# Patient Record
Sex: MALE | Race: WHITE
[De-identification: names, ages, dates, MRNs, and addresses within clinical notes are randomized per-mention and may not be internally consistent; named-entity substitution may affect disease eponyms.]

---

## 2019-09-02 ENCOUNTER — HOSPITAL ENCOUNTER (EMERGENCY)
Dept: HOSPITAL 47 - EC | Age: 54
LOS: 1 days | Discharge: HOME | End: 2019-09-03
Payer: COMMERCIAL

## 2019-09-02 VITALS — TEMPERATURE: 98.1 F

## 2019-09-02 DIAGNOSIS — I16.0: Primary | ICD-10-CM

## 2019-09-02 DIAGNOSIS — K21.9: ICD-10-CM

## 2019-09-02 DIAGNOSIS — Z88.0: ICD-10-CM

## 2019-09-02 DIAGNOSIS — Z79.82: ICD-10-CM

## 2019-09-02 DIAGNOSIS — Z88.5: ICD-10-CM

## 2019-09-02 DIAGNOSIS — Z53.20: ICD-10-CM

## 2019-09-02 DIAGNOSIS — I10: ICD-10-CM

## 2019-09-02 DIAGNOSIS — Z79.899: ICD-10-CM

## 2019-09-02 LAB
ALBUMIN SERPL-MCNC: 4.5 G/DL (ref 3.5–5)
ALP SERPL-CCNC: 107 U/L (ref 38–126)
ALT SERPL-CCNC: 100 U/L (ref 21–72)
ANION GAP SERPL CALC-SCNC: 10 MMOL/L
AST SERPL-CCNC: 55 U/L (ref 17–59)
BASOPHILS # BLD AUTO: 0.1 K/UL (ref 0–0.2)
BASOPHILS NFR BLD AUTO: 1 %
BUN SERPL-SCNC: 16 MG/DL (ref 9–20)
CALCIUM SPEC-MCNC: 9.4 MG/DL (ref 8.4–10.2)
CHLORIDE SERPL-SCNC: 108 MMOL/L (ref 98–107)
CO2 SERPL-SCNC: 24 MMOL/L (ref 22–30)
EOSINOPHIL # BLD AUTO: 0.1 K/UL (ref 0–0.7)
EOSINOPHIL NFR BLD AUTO: 1 %
ERYTHROCYTE [DISTWIDTH] IN BLOOD BY AUTOMATED COUNT: 4.77 M/UL (ref 4.3–5.9)
ERYTHROCYTE [DISTWIDTH] IN BLOOD: 14.9 % (ref 11.5–15.5)
GLUCOSE SERPL-MCNC: 144 MG/DL (ref 74–99)
HCT VFR BLD AUTO: 44 % (ref 39–53)
HGB BLD-MCNC: 15.8 GM/DL (ref 13–17.5)
LYMPHOCYTES # SPEC AUTO: 1.7 K/UL (ref 1–4.8)
LYMPHOCYTES NFR SPEC AUTO: 28 %
MAGNESIUM SPEC-SCNC: 2.2 MG/DL (ref 1.6–2.3)
MCH RBC QN AUTO: 33.1 PG (ref 25–35)
MCHC RBC AUTO-ENTMCNC: 35.8 G/DL (ref 31–37)
MCV RBC AUTO: 92.4 FL (ref 80–100)
MONOCYTES # BLD AUTO: 0.4 K/UL (ref 0–1)
MONOCYTES NFR BLD AUTO: 6 %
NEUTROPHILS # BLD AUTO: 3.7 K/UL (ref 1.3–7.7)
NEUTROPHILS NFR BLD AUTO: 61 %
PH UR: 6.5 [PH] (ref 5–8)
PLATELET # BLD AUTO: 192 K/UL (ref 150–450)
POTASSIUM SERPL-SCNC: 4.1 MMOL/L (ref 3.5–5.1)
PROT SERPL-MCNC: 7.6 G/DL (ref 6.3–8.2)
SODIUM SERPL-SCNC: 142 MMOL/L (ref 137–145)
SP GR UR: 1.01 (ref 1–1.03)
UROBILINOGEN UR QL STRIP: 2 MG/DL (ref ?–2)
WBC # BLD AUTO: 6.1 K/UL (ref 3.8–10.6)

## 2019-09-02 PROCEDURE — 81003 URINALYSIS AUTO W/O SCOPE: CPT

## 2019-09-02 PROCEDURE — 99284 EMERGENCY DEPT VISIT MOD MDM: CPT

## 2019-09-02 PROCEDURE — 70450 CT HEAD/BRAIN W/O DYE: CPT

## 2019-09-02 PROCEDURE — 93005 ELECTROCARDIOGRAM TRACING: CPT

## 2019-09-02 PROCEDURE — 96375 TX/PRO/DX INJ NEW DRUG ADDON: CPT

## 2019-09-02 PROCEDURE — 36415 COLL VENOUS BLD VENIPUNCTURE: CPT

## 2019-09-02 PROCEDURE — 85025 COMPLETE CBC W/AUTO DIFF WBC: CPT

## 2019-09-02 PROCEDURE — 83735 ASSAY OF MAGNESIUM: CPT

## 2019-09-02 PROCEDURE — 96374 THER/PROPH/DIAG INJ IV PUSH: CPT

## 2019-09-02 PROCEDURE — 80053 COMPREHEN METABOLIC PANEL: CPT

## 2019-09-02 PROCEDURE — 96361 HYDRATE IV INFUSION ADD-ON: CPT

## 2019-09-02 NOTE — CT
EXAMINATION TYPE: CT brain wo con

 

DATE OF EXAM: 9/2/2019

 

COMPARISON: 4/24/2013

 

HISTORY: Hypertension and headache.

 

CT DLP: 1137.4 mGycm

Automated exposure control for dose reduction was used.

 

FINDINGS: 

Ventricles and sulci appear normal. There is no mass effect nor midline shift. There is no sign of in
tracranial hemorrhage. The calvarium is intact. There is no sign of cerebral edema.

 

IMPRESSION: 

NEGATIVE CT SCAN OF THE BRAIN. NO CHANGE.

## 2019-09-03 VITALS — DIASTOLIC BLOOD PRESSURE: 99 MMHG | SYSTOLIC BLOOD PRESSURE: 165 MMHG | HEART RATE: 78 BPM

## 2019-09-03 VITALS — RESPIRATION RATE: 18 BRPM

## 2019-09-03 NOTE — ED
Headache HPI





- General


Chief Complaint: Headache


Stated Complaint: High blood pressure, headache


Time Seen by Provider: 09/02/19 20:37


Mode of arrival: ambulatory


Limitations: no limitations





- History of Present Illness


Initial Comments: 


54-year-old male patient presents to the emergency department today for 

evaluation of elevated blood pressure and headache.  Patient states that he has 

been feeling unwell for the last 2-3 weeks.  Patient states that he was at his 

doctor's office this week and did have elevated blood pressure.  Patient states 

that his headache persisted today so he checked his blood pressure at home which

was elevated multiple times.  Patient denies any blurred or double vision.  

Denies any nausea or vomiting.  Denies numbness or tingling to the extremities. 

He denies this being the worst headache of his life.  States as a dull aching 

sensation in the back of his head.  Denies any head trauma.  Denies chest pain, 

shortness of breath, dizziness, or weakness.  Patient does take Cartia for blood

pressure, states he is taking this for many years.  He does report taking as 

directed. Patient denies any recent rash, fever, chills, abdominal pain, diarr

hea, constipation, back pain, hematuria, dysuria, urinary urgency, urinary 

frequency, or any other complaints.








- Related Data


                                Home Medications











 Medication  Instructions  Recorded  Confirmed


 


Omeprazole [PriLOSEC] 40 mg PO AC-BRKFST 04/05/15 10/30/15


 


amLODIPine [Norvasc] 5 mg PO DAILY 04/05/15 10/30/15


 


ALPRAZolam 0.5 mg PO AS DIRECTED PRN 05/07/15 10/30/15


 


Aspirin EC [Ecotrin] 81 mg PO DAILY 05/07/15 10/27/15


 


Calcium Polycarbophil [Fibercon] 625 mg PO DAILY PRN 05/07/15 10/30/15


 


Dextroamphetamine/Amphetamine 20 mg PO DAILY 05/07/15 10/30/15





[Adderall]   


 


Ibuprofen [Advil] 200 mg PO Q8HR PRN 05/07/15 10/27/15


 


Methocarbamol 500 mg PO BID PRN 05/07/15 10/30/15


 


Multivitamins, Thera [Theragran] 1 each PO DAILY 05/07/15 10/27/15


 


Omega-3 Fatty Acids/Fish Oil [Fish 4 - 5 each PO DAILY 05/07/15 10/30/15





Oil 1,000 mg Softgel]   


 


Donnatal(Dose Unknown) 1 tab PO AS DIRECTED PRN 10/27/15 10/30/15











                                    Allergies











Allergy/AdvReac Type Severity Reaction Status Date / Time


 


amoxicillin Allergy  Rash/Hives Verified 09/02/19 20:14


 


morphine Allergy  Rash/Hives Verified 09/02/19 20:14














Review of Systems


ROS Statement: 


Those systems with pertinent positive or pertinent negative responses have been 

documented in the HPI.





ROS Other: All systems not noted in ROS Statement are negative.





Past Medical History


Past Medical History: GERD/Reflux, Hypertension


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


History of Any Multi-Drug Resistant Organisms: None Reported


Past Surgical History: Cholecystectomy, Orthopedic Surgery


Additional Past Surgical History / Comment(s): sleep apnea surgery, laproscopic 

repair for reflux, left knee surgery, rt ankle surgery


Past Anesthesia/Blood Transfusion Reactions: No Reported Reaction


Past Psychological History: No Psychological Hx Reported


Smoking Status: Never smoker


Past Alcohol Use History: Occasional


Past Drug Use History: None Reported





- Past Family History


  ** Mother


Family Medical History: No Reported History





General Exam


Limitations: no limitations


General appearance: alert, in no apparent distress, other (This is a well-

developed, well-nourished adult male patient in no acute distress.  Vital signs 

upon presentation are temperature 98.1F, pulse 88, respirations 18, blood 

pressure 198/121, pulse ox 98% on room air.)


Eye exam: Present: normal appearance, PERRL, EOMI.  Absent: scleral icterus, 

conjunctival injection, periorbital swelling


ENT exam: Present: normal exam, normal oropharynx, mucous membranes moist


Respiratory exam: Present: normal lung sounds bilaterally.  Absent: respiratory 

distress, wheezes, rales, rhonchi, stridor


Cardiovascular Exam: Present: regular rate, normal rhythm, normal heart sounds. 

Absent: systolic murmur, diastolic murmur, rubs, gallop, clicks


GI/Abdominal exam: Present: soft, normal bowel sounds.  Absent: distended, 

tenderness, guarding, rebound, rigid


Neurological exam: Present: alert, oriented X3, CN II-XII intact, other 

(Strength in all 4 extremities is 5/5.)


Psychiatric exam: Present: normal affect, normal mood


Skin exam: Present: warm, dry, intact, normal color.  Absent: rash





Course


                                   Vital Signs











  09/02/19 09/02/19 09/02/19





  20:08 21:09 21:40


 


Temperature 98.1 F  


 


Pulse Rate 88 86 84


 


Respiratory 18 18 18





Rate   


 


Blood Pressure 198/121 178/117 169/115


 


O2 Sat by Pulse 98 96 95





Oximetry   














  09/02/19 09/02/19 09/03/19





  21:59 22:38 00:13


 


Temperature   


 


Pulse Rate 84 81 68


 


Respiratory 16 16 18





Rate   


 


Blood Pressure 157/115 158/107 164/115


 


O2 Sat by Pulse 95 94 L 96





Oximetry   














  09/03/19





  00:49


 


Temperature 


 


Pulse Rate 78


 


Respiratory 18





Rate 


 


Blood Pressure 165/99


 


O2 Sat by Pulse 96





Oximetry 














Medical Decision Making





- Medical Decision Making


54-year-old male patient presents to the emergency department today for 

evaluation of headache and elevated blood pressure.  Physical examination is u

nremarkable.  He is neurologically intact with no focal deficits.  He denies 

this being the worse headache of his life.  CT of the brain was unremarkable.  

Labs reviewed and were unremarkable.  There is no evidence for end organ damage.

 Patient symptoms consistent with hypertensive urgency.  He was given a few 

different blood pressure medications here in the emergency department.  Blood 

pressure did improve to the 160 systolic over 90s diastolic.  Patient will be 

discharged home at this time to follow-up with his primary care physician for 

further evaluation.  He is instructed to call in the morning for further 

instructions.  Return parameters were discussed in detail.  He verbalizes unders

tanding and agrees this plan.








- Lab Data


Result diagrams: 


                                 09/02/19 21:04





                                 09/02/19 21:04


                                   Lab Results











  09/02/19 09/02/19 09/02/19 Range/Units





  21:04 21:04 Unknown 


 


WBC  6.1    (3.8-10.6)  k/uL


 


RBC  4.77    (4.30-5.90)  m/uL


 


Hgb  15.8    (13.0-17.5)  gm/dL


 


Hct  44.0    (39.0-53.0)  %


 


MCV  92.4    (80.0-100.0)  fL


 


MCH  33.1    (25.0-35.0)  pg


 


MCHC  35.8    (31.0-37.0)  g/dL


 


RDW  14.9    (11.5-15.5)  %


 


Plt Count  192    (150-450)  k/uL


 


Neutrophils %  61    %


 


Lymphocytes %  28    %


 


Monocytes %  6    %


 


Eosinophils %  1    %


 


Basophils %  1    %


 


Neutrophils #  3.7    (1.3-7.7)  k/uL


 


Lymphocytes #  1.7    (1.0-4.8)  k/uL


 


Monocytes #  0.4    (0-1.0)  k/uL


 


Eosinophils #  0.1    (0-0.7)  k/uL


 


Basophils #  0.1    (0-0.2)  k/uL


 


Sodium   142   (137-145)  mmol/L


 


Potassium   4.1   (3.5-5.1)  mmol/L


 


Chloride   108 H   ()  mmol/L


 


Carbon Dioxide   24   (22-30)  mmol/L


 


Anion Gap   10   mmol/L


 


BUN   16   (9-20)  mg/dL


 


Creatinine   0.84   (0.66-1.25)  mg/dL


 


Est GFR (CKD-EPI)AfAm   >90   (>60 ml/min/1.73 sqM)  


 


Est GFR (CKD-EPI)NonAf   >90   (>60 ml/min/1.73 sqM)  


 


Glucose   144 H   (74-99)  mg/dL


 


Calcium   9.4   (8.4-10.2)  mg/dL


 


Magnesium   2.2   (1.6-2.3)  mg/dL


 


Total Bilirubin   0.6   (0.2-1.3)  mg/dL


 


AST   55   (17-59)  U/L


 


ALT   100 H   (21-72)  U/L


 


Alkaline Phosphatase   107   ()  U/L


 


Total Protein   7.6   (6.3-8.2)  g/dL


 


Albumin   4.5   (3.5-5.0)  g/dL


 


Urine Color    Yellow  


 


Urine Appearance    Clear  (Clear)  


 


Urine pH    6.5  (5.0-8.0)  


 


Ur Specific Gravity    1.014  (1.001-1.035)  


 


Urine Protein    Negative  (Negative)  


 


Urine Glucose (UA)    Trace H  (Negative)  


 


Urine Ketones    Negative  (Negative)  


 


Urine Blood    Negative  (Negative)  


 


Urine Nitrite    Negative  (Negative)  


 


Urine Bilirubin    Negative  (Negative)  


 


Urine Urobilinogen    2.0  (<2.0)  mg/dL


 


Ur Leukocyte Esterase    Negative  (Negative)  














- Radiology Data


Radiology results: report reviewed, image reviewed


CT brain without contrast was obtained.  Report was reviewed in its entirety.  

Impression by Dr. Ragland shows negative computed tomography scan of the 

brain.  No change.





Disposition


Clinical Impression: 


 Hypertensive urgency, Headache





Disposition: HOME SELF-CARE


Condition: Good


Instructions (If sedation given, give patient instructions):  Acute Headache (

ED), Hypertension (ED)


Additional Instructions: 


Follow-up with your primary care physician for recheck in the morning.  Return 

to the emergency department for new, worsening, or concerning symptoms.


Is patient prescribed a controlled substance at d/c from ED?: No


Referrals: 


Yrn Carlin MD [Primary Care Provider] - 1-2 days


Time of Disposition: 00:52

## 2019-09-16 ENCOUNTER — HOSPITAL ENCOUNTER (OUTPATIENT)
Dept: HOSPITAL 47 - LABWHC1 | Age: 54
Discharge: HOME | End: 2019-09-16
Attending: INTERNAL MEDICINE
Payer: COMMERCIAL

## 2019-09-16 DIAGNOSIS — R73.9: ICD-10-CM

## 2019-09-16 DIAGNOSIS — I10: Primary | ICD-10-CM

## 2019-09-16 DIAGNOSIS — K76.9: ICD-10-CM

## 2019-09-16 PROCEDURE — 83835 ASSAY OF METANEPHRINES: CPT

## 2019-09-16 PROCEDURE — 82384 ASSAY THREE CATECHOLAMINES: CPT

## 2019-09-17 ENCOUNTER — HOSPITAL ENCOUNTER (OUTPATIENT)
Dept: HOSPITAL 47 - LABWHC1 | Age: 54
Discharge: HOME | End: 2019-09-17
Attending: INTERNAL MEDICINE
Payer: COMMERCIAL

## 2019-09-17 DIAGNOSIS — K76.9: ICD-10-CM

## 2019-09-17 DIAGNOSIS — I10: Primary | ICD-10-CM

## 2019-09-17 LAB
ALBUMIN SERPL-MCNC: 4.9 G/DL (ref 3.8–4.9)
ALBUMIN/GLOB SERPL: 1.96 G/DL (ref 1.6–3.17)
ALP SERPL-CCNC: 77 U/L (ref 41–126)
ALT SERPL-CCNC: 116 U/L (ref 10–49)
ANION GAP SERPL CALC-SCNC: 12.1 MMOL/L (ref 4–12)
AST SERPL-CCNC: 65 U/L (ref 14–35)
BASOPHILS # BLD AUTO: 0.1 K/UL (ref 0–0.2)
BASOPHILS NFR BLD AUTO: 1 %
BUN SERPL-SCNC: 19 MG/DL (ref 9–27)
BUN/CREAT SERPL: 15.83 RATIO (ref 12–20)
CALCIUM SPEC-MCNC: 9.8 MG/DL (ref 8.7–10.3)
CHLORIDE SERPL-SCNC: 100 MMOL/L (ref 96–109)
CO2 SERPL-SCNC: 27.9 MMOL/L (ref 21.6–31.8)
EOSINOPHIL # BLD AUTO: 0.1 K/UL (ref 0–0.7)
EOSINOPHIL NFR BLD AUTO: 1 %
ERYTHROCYTE [DISTWIDTH] IN BLOOD BY AUTOMATED COUNT: 5.08 M/UL (ref 4.3–5.9)
ERYTHROCYTE [DISTWIDTH] IN BLOOD: 12.6 % (ref 11.5–15.5)
GLOBULIN SER CALC-MCNC: 2.5 G/DL (ref 1.6–3.3)
GLUCOSE SERPL-MCNC: 103 MG/DL (ref 70–110)
HCT VFR BLD AUTO: 46.4 % (ref 39–53)
HGB BLD-MCNC: 16.5 GM/DL (ref 13–17.5)
LYMPHOCYTES # SPEC AUTO: 2.2 K/UL (ref 1–4.8)
LYMPHOCYTES NFR SPEC AUTO: 27 %
MCH RBC QN AUTO: 32.4 PG (ref 25–35)
MCHC RBC AUTO-ENTMCNC: 35.5 G/DL (ref 31–37)
MCV RBC AUTO: 91.4 FL (ref 80–100)
MONOCYTES # BLD AUTO: 0.6 K/UL (ref 0–1)
MONOCYTES NFR BLD AUTO: 7 %
NEUTROPHILS # BLD AUTO: 5 K/UL (ref 1.3–7.7)
NEUTROPHILS NFR BLD AUTO: 61 %
PLATELET # BLD AUTO: 235 K/UL (ref 150–450)
POTASSIUM SERPL-SCNC: 3.8 MMOL/L (ref 3.5–5.5)
PROT SERPL-MCNC: 7.4 G/DL (ref 6.2–8.2)
SODIUM SERPL-SCNC: 140 MMOL/L (ref 135–145)
WBC # BLD AUTO: 8.3 K/UL (ref 3.8–10.6)

## 2019-09-17 PROCEDURE — 36415 COLL VENOUS BLD VENIPUNCTURE: CPT

## 2019-09-17 PROCEDURE — 85025 COMPLETE CBC W/AUTO DIFF WBC: CPT

## 2019-09-17 PROCEDURE — 83036 HEMOGLOBIN GLYCOSYLATED A1C: CPT

## 2019-09-17 PROCEDURE — 80053 COMPREHEN METABOLIC PANEL: CPT

## 2019-09-18 LAB — HBA1C MFR BLD: 5.4 % (ref 4–6)

## 2019-09-19 ENCOUNTER — HOSPITAL ENCOUNTER (EMERGENCY)
Dept: HOSPITAL 47 - EC | Age: 54
Discharge: HOME | End: 2019-09-19
Payer: COMMERCIAL

## 2019-09-19 VITALS — TEMPERATURE: 97.9 F

## 2019-09-19 VITALS — RESPIRATION RATE: 18 BRPM

## 2019-09-19 VITALS — SYSTOLIC BLOOD PRESSURE: 134 MMHG | DIASTOLIC BLOOD PRESSURE: 89 MMHG | HEART RATE: 77 BPM

## 2019-09-19 DIAGNOSIS — Z79.899: ICD-10-CM

## 2019-09-19 DIAGNOSIS — R20.0: ICD-10-CM

## 2019-09-19 DIAGNOSIS — Z88.0: ICD-10-CM

## 2019-09-19 DIAGNOSIS — R20.2: ICD-10-CM

## 2019-09-19 DIAGNOSIS — Z88.5: ICD-10-CM

## 2019-09-19 DIAGNOSIS — Z53.20: ICD-10-CM

## 2019-09-19 DIAGNOSIS — I10: Primary | ICD-10-CM

## 2019-09-19 DIAGNOSIS — K21.9: ICD-10-CM

## 2019-09-19 DIAGNOSIS — R51: ICD-10-CM

## 2019-09-19 DIAGNOSIS — Z79.82: ICD-10-CM

## 2019-09-19 LAB
ALBUMIN SERPL-MCNC: 4.9 G/DL (ref 3.5–5)
ALP SERPL-CCNC: 69 U/L (ref 38–126)
ALT SERPL-CCNC: 113 U/L (ref 21–72)
ANION GAP SERPL CALC-SCNC: 15 MMOL/L
APTT BLD: 25.4 SEC (ref 22–30)
AST SERPL-CCNC: 66 U/L (ref 17–59)
BASOPHILS # BLD AUTO: 0.1 K/UL (ref 0–0.2)
BASOPHILS NFR BLD AUTO: 1 %
BUN SERPL-SCNC: 22 MG/DL (ref 9–20)
CALCIUM SPEC-MCNC: 10.4 MG/DL (ref 8.4–10.2)
CHLORIDE SERPL-SCNC: 101 MMOL/L (ref 98–107)
CO2 SERPL-SCNC: 24 MMOL/L (ref 22–30)
EOSINOPHIL # BLD AUTO: 0.2 K/UL (ref 0–0.7)
EOSINOPHIL NFR BLD AUTO: 2 %
ERYTHROCYTE [DISTWIDTH] IN BLOOD BY AUTOMATED COUNT: 5.21 M/UL (ref 4.3–5.9)
ERYTHROCYTE [DISTWIDTH] IN BLOOD: 14.4 % (ref 11.5–15.5)
GLUCOSE BLD-MCNC: 145 MG/DL (ref 75–99)
GLUCOSE SERPL-MCNC: 144 MG/DL (ref 74–99)
HCT VFR BLD AUTO: 48.1 % (ref 39–53)
HGB BLD-MCNC: 16.9 GM/DL (ref 13–17.5)
INR PPP: 1 (ref ?–1.2)
LYMPHOCYTES # SPEC AUTO: 2 K/UL (ref 1–4.8)
LYMPHOCYTES NFR SPEC AUTO: 25 %
MAGNESIUM SPEC-SCNC: 1.9 MG/DL (ref 1.6–2.3)
MCH RBC QN AUTO: 32.5 PG (ref 25–35)
MCHC RBC AUTO-ENTMCNC: 35.2 G/DL (ref 31–37)
MCV RBC AUTO: 92.3 FL (ref 80–100)
MONOCYTES # BLD AUTO: 0.5 K/UL (ref 0–1)
MONOCYTES NFR BLD AUTO: 7 %
NEUTROPHILS # BLD AUTO: 5 K/UL (ref 1.3–7.7)
NEUTROPHILS NFR BLD AUTO: 63 %
PLATELET # BLD AUTO: 230 K/UL (ref 150–450)
POTASSIUM SERPL-SCNC: 3.6 MMOL/L (ref 3.5–5.1)
PROT SERPL-MCNC: 8.4 G/DL (ref 6.3–8.2)
PT BLD: 10.9 SEC (ref 9–12)
SODIUM SERPL-SCNC: 140 MMOL/L (ref 137–145)
WBC # BLD AUTO: 8 K/UL (ref 3.8–10.6)

## 2019-09-19 PROCEDURE — 84484 ASSAY OF TROPONIN QUANT: CPT

## 2019-09-19 PROCEDURE — 85610 PROTHROMBIN TIME: CPT

## 2019-09-19 PROCEDURE — 96365 THER/PROPH/DIAG IV INF INIT: CPT

## 2019-09-19 PROCEDURE — 93005 ELECTROCARDIOGRAM TRACING: CPT

## 2019-09-19 PROCEDURE — 85025 COMPLETE CBC W/AUTO DIFF WBC: CPT

## 2019-09-19 PROCEDURE — 99284 EMERGENCY DEPT VISIT MOD MDM: CPT

## 2019-09-19 PROCEDURE — 85730 THROMBOPLASTIN TIME PARTIAL: CPT

## 2019-09-19 PROCEDURE — 83735 ASSAY OF MAGNESIUM: CPT

## 2019-09-19 PROCEDURE — 70450 CT HEAD/BRAIN W/O DYE: CPT

## 2019-09-19 PROCEDURE — 96375 TX/PRO/DX INJ NEW DRUG ADDON: CPT

## 2019-09-19 PROCEDURE — 80053 COMPREHEN METABOLIC PANEL: CPT

## 2019-09-19 PROCEDURE — 36415 COLL VENOUS BLD VENIPUNCTURE: CPT

## 2019-09-19 NOTE — CT
EXAMINATION TYPE: CT brain wo con

 

DATE OF EXAM: 9/19/2019

 

COMPARISON: 9/2/2019

 

HISTORY: 54-year-old male headache, left side facial numbness, high blood pressure

 

TECHNIQUE:  Examination was done in axial plane without intravenous contrast.  Coronal and sagittal r
econstructions performed.

 

CT DLP: 1094.4 mGycm

Automated exposure control for dose reduction was used.

 

FINDINGS:

There is no evidence of  acute intracranial hemorrhage, acute ischemic changes, mass, mass-effect, or
 extra-axial fluid collection.  There is no effacement of cerebral sulci or basal subarachnoid cister
ns.  There is no hydrocephalus.  There is no midline shift.  Gray-white matter distinction is preserv
ed.

 

Partially empty sella.

 

Small amount of trapped fluid in inferior left mastoid air cells. Visualized paranasal sinuses are we
ll pneumatized. Visualized orbits and globes are intact.

 

 

IMPRESSION:

No acute intracranial abnormality seen. 

 

Small amount of trapped fluid in inferior left mastoid air cells. Correlate for any mastoid pain to e
xclude mastoiditis.

## 2019-09-19 NOTE — ED
General Adult HPI





- General


Chief complaint: Neuro Symptoms/Deficit


Stated complaint: Stroke symptoms


Time Seen by Provider: 09/19/19 13:15


Source: patient


Mode of arrival: wheelchair


Limitations: no limitations





- History of Present Illness


Initial comments: 


The patient is a 54-year-old male with past medical history of hypertension who 

presents to emergency room with reported elevated blood pressures.  The patient 

states that several weeks back he began having frequent headaches.  He has a 

history of hypertension was taking his medications as directed.  States that he 

began taking his blood pressure readings and was noted that they were high.  He 

has been following up with his primary care physician Dr. Grande who has been 

adjusting his high blood pressure medications.  He has also had one emergency 

room visit when his blood pressure was close to 200 systolic.  Evaluation at 

that time proved no findings and the patient was sent home.  Today the patient 

began having a headache once again at 5 AM.  He describes it as a pain in the 

front of his head graded 4 out of 10.  He also noted that he had some numbness 

and tingling to the left side of his head.  Denies any unilateral weakness.  No 

numbness or tingling in his extremities.  There was no confusion or slurred 

speech from the patient.  This is not the worse headache of his life.  No sudden

onset or maximal intensity.  Denies any visual changes.  No fevers or chills.  

Denies any neck pain or stiffness.  The patient did take his blood pressure at 

home and was noted to be high.  He was most recently placed on hydralazine.  

States that he is most taken extra hydralazine when his blood pressure is high. 

He did take these and came into the emergency room for evaluation.  He denies 

any chest pain or shortness of breath.  He does have a renal artery ultrasound 

scheduled for Tuesday.  There are no other alleviating, precipitating or 

modifying factors





- Related Data


                                Home Medications











 Medication  Instructions  Recorded  Confirmed


 


Omeprazole [PriLOSEC] 40 mg PO AC-BRKFST 04/05/15 09/19/19


 


Aspirin EC [Ecotrin] 81 mg PO DAILY 05/07/15 09/19/19


 


Multivitamins, Thera [Theragran] 1 tab PO DAILY 05/07/15 09/19/19


 


Omega-3 Fatty Acids/Fish Oil [Fish 1 cap PO DAILY 05/07/15 09/19/19





Oil 1,000 mg Softgel]   


 


Chlorthalidone 25 mg PO DAILY 09/19/19 09/19/19


 


Diltiazem HCl [Cartia Xt] 180 mg PO DAILY 09/19/19 09/19/19


 


Oxymetazoline 0.05% Nasl Spray 2 spray EA NOSTRIL BID PRN 09/19/19 09/19/19





[Afrin 0.05% Nasal Spray]   


 


hydrALAZINE HCL [Apresoline] 25 mg PO BID PRN 09/19/19 09/19/19


 


hydrALAZINE HCL [Apresoline] 100 mg PO TID 09/19/19 09/19/19











                                    Allergies











Allergy/AdvReac Type Severity Reaction Status Date / Time


 


amoxicillin Allergy  Anaphylaxis Verified 09/19/19 13:49


 


morphine Allergy  Anaphylaxis Verified 09/19/19 13:49














Review of Systems


ROS Statement: 


Those systems with pertinent positive or pertinent negative responses have been 

documented in the HPI.





ROS Other: All systems not noted in ROS Statement are negative.





Past Medical History


Past Medical History: GERD/Reflux, Hypertension


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


History of Any Multi-Drug Resistant Organisms: None Reported


Past Surgical History: Cholecystectomy, Orthopedic Surgery


Additional Past Surgical History / Comment(s): sleep apnea surgery, laproscopic 

repair for reflux, left knee surgery, rt ankle surgery


Past Anesthesia/Blood Transfusion Reactions: No Reported Reaction


Past Psychological History: Depression


Smoking Status: Never smoker


Past Alcohol Use History: Occasional


Past Drug Use History: None Reported





- Past Family History


  ** Mother


Family Medical History: No Reported History





General Exam


Limitations: no limitations


General appearance: alert, in no apparent distress


Head exam: Present: atraumatic, normocephalic, normal inspection


Eye exam: Present: normal appearance, PERRL, EOMI.  Absent: scleral icterus, 

conjunctival injection, periorbital swelling


ENT exam: Present: normal exam, mucous membranes moist


Neck exam: Present: normal inspection.  Absent: tenderness, meningismus, 

lymphadenopathy


Respiratory exam: Present: normal lung sounds bilaterally.  Absent: respiratory 

distress, wheezes, rales, rhonchi, stridor


Cardiovascular Exam: Present: regular rate, normal rhythm, normal heart sounds. 

 Absent: systolic murmur, diastolic murmur, rubs, gallop, clicks


GI/Abdominal exam: Present: soft, normal bowel sounds.  Absent: distended, 

tenderness, guarding, rebound, rigid


Extremities exam: Present: normal inspection, full ROM, normal capillary refill.

  Absent: tenderness, pedal edema, joint swelling, calf tenderness


Back exam: Present: normal inspection


Neurological exam: Present: alert, oriented X3, CN II-XII intact


Psychiatric exam: Present: normal affect, normal mood


Skin exam: Present: warm, dry, intact, normal color.  Absent: rash





Course


                                   Vital Signs











  09/19/19 09/19/19 09/19/19





  13:12 14:40 14:50


 


Temperature 97.9 F  


 


Pulse Rate 107 H  87


 


Respiratory 18  16





Rate   


 


Blood Pressure 174/133 151/119 141/104


 


O2 Sat by Pulse 97  95





Oximetry   














  09/19/19 09/19/19 09/19/19





  15:00 15:33 16:00


 


Temperature   


 


Pulse Rate 86 75 72


 


Respiratory  18 





Rate   


 


Blood Pressure 141/104 136/98 136/98


 


O2 Sat by Pulse 95 96 95





Oximetry   














  09/19/19





  17:00


 


Temperature 


 


Pulse Rate 77


 


Respiratory 18





Rate 


 


Blood Pressure 134/89


 


O2 Sat by Pulse 97





Oximetry 














EKG Findings





- EKG Comments:


EKG Findings:: EKG demonstrates a sinus tachycardia with a ventricular rate of 

107.  MD interval is 210.  .  .  No acute ST segment elevations or

 depressions concerning for ischemic changes. first-degree AV block





Medical Decision Making





- Medical Decision Making


Upon arrival the patient is placed into room 7.  A thorough history and physical

 exam was performed.  Peripheral IV was established.  I did conduct NIH stroke 

scaling and the patient does not have any focal neurologic deficits.  I did 

provide the patient with 10 mg of Decadron, 25 mg Benadryl, 10 mg of Reglan and 

1 g of magnesium.  I recommended laboratory studies as well as a CT of the 

patient's brain because of his unilateral numbness in his head.  Laboratory 

studies are remarkable for a glucose of 145.  Calcium is 10.4.  AST 66.  

.  Protein 8.4.  CT of the patient's brain demonstrates no acute intracranial 

abnormality.  Small amount of trapped fluid in the inferior left mastoid air 

cells.  I did discuss these results with the patient.  He has no new focal 

neurologic deficits at this time.  The patient's blood pressure has been 

repetitively evaluated and maintained a systolic of 130 to 150.  The patient 

does have an elevated diastolic pressure.  The patient stated that he was due 

for one is scheduled hydralazine.So i do inform him to take this in the 

emergency room.  I did discuss diagnosis, differential and treatment options.  I

 did recommend the patient be admitted to the hospital in order to further 

evaluate his blood pressure and obtain better control.  The patient refused.  He

 states that his headache is greatly improved.  He was rather follow up in 

office with his primary care physician for further evaluation of his blood 

pressure.  I instructed the patient to keep taking his blood pressure medication

 as directed.  Call for sooner appointment with his primary care physician.  The

 patient understood this.  If he has any new or worsening symptoms, he should 

return to the emergency room.  Patient was then discharged in stable condition





- Lab Data


Result diagrams: 


                                 09/19/19 13:30





                                 09/19/19 13:30


                                   Lab Results











  09/19/19 09/19/19 09/19/19 Range/Units





  13:30 13:30 13:30 


 


WBC   8.0   (3.8-10.6)  k/uL


 


RBC   5.21   (4.30-5.90)  m/uL


 


Hgb   16.9   (13.0-17.5)  gm/dL


 


Hct   48.1   (39.0-53.0)  %


 


MCV   92.3   (80.0-100.0)  fL


 


MCH   32.5   (25.0-35.0)  pg


 


MCHC   35.2   (31.0-37.0)  g/dL


 


RDW   14.4   (11.5-15.5)  %


 


Plt Count   230   (150-450)  k/uL


 


Neutrophils %   63   %


 


Lymphocytes %   25   %


 


Monocytes %   7   %


 


Eosinophils %   2   %


 


Basophils %   1   %


 


Neutrophils #   5.0   (1.3-7.7)  k/uL


 


Lymphocytes #   2.0   (1.0-4.8)  k/uL


 


Monocytes #   0.5   (0-1.0)  k/uL


 


Eosinophils #   0.2   (0-0.7)  k/uL


 


Basophils #   0.1   (0-0.2)  k/uL


 


PT     (9.0-12.0)  sec


 


INR     (<1.2)  


 


APTT     (22.0-30.0)  sec


 


Sodium    140  (137-145)  mmol/L


 


Potassium    3.6  (3.5-5.1)  mmol/L


 


Chloride    101  ()  mmol/L


 


Carbon Dioxide    24  (22-30)  mmol/L


 


Anion Gap    15  mmol/L


 


BUN    22 H  (9-20)  mg/dL


 


Creatinine    1.14  (0.66-1.25)  mg/dL


 


Est GFR (CKD-EPI)AfAm    84  (>60 ml/min/1.73 sqM)  


 


Est GFR (CKD-EPI)NonAf    72  (>60 ml/min/1.73 sqM)  


 


Glucose    144 H  (74-99)  mg/dL


 


POC Glucose (mg/dL)  145 H    (75-99)  mg/dL


 


POC Glu Operater ID  Kevin Del Cid    


 


Calcium    10.4 H  (8.4-10.2)  mg/dL


 


Magnesium    1.9  (1.6-2.3)  mg/dL


 


Total Bilirubin    0.8  (0.2-1.3)  mg/dL


 


AST    66 H  (17-59)  U/L


 


ALT    113 H  (21-72)  U/L


 


Alkaline Phosphatase    69  ()  U/L


 


Troponin I     (0.000-0.034)  ng/mL


 


Total Protein    8.4 H  (6.3-8.2)  g/dL


 


Albumin    4.9  (3.5-5.0)  g/dL














  09/19/19 09/19/19 Range/Units





  13:30 13:30 


 


WBC    (3.8-10.6)  k/uL


 


RBC    (4.30-5.90)  m/uL


 


Hgb    (13.0-17.5)  gm/dL


 


Hct    (39.0-53.0)  %


 


MCV    (80.0-100.0)  fL


 


MCH    (25.0-35.0)  pg


 


MCHC    (31.0-37.0)  g/dL


 


RDW    (11.5-15.5)  %


 


Plt Count    (150-450)  k/uL


 


Neutrophils %    %


 


Lymphocytes %    %


 


Monocytes %    %


 


Eosinophils %    %


 


Basophils %    %


 


Neutrophils #    (1.3-7.7)  k/uL


 


Lymphocytes #    (1.0-4.8)  k/uL


 


Monocytes #    (0-1.0)  k/uL


 


Eosinophils #    (0-0.7)  k/uL


 


Basophils #    (0-0.2)  k/uL


 


PT  10.9   (9.0-12.0)  sec


 


INR  1.0   (<1.2)  


 


APTT  25.4   (22.0-30.0)  sec


 


Sodium    (137-145)  mmol/L


 


Potassium    (3.5-5.1)  mmol/L


 


Chloride    ()  mmol/L


 


Carbon Dioxide    (22-30)  mmol/L


 


Anion Gap    mmol/L


 


BUN    (9-20)  mg/dL


 


Creatinine    (0.66-1.25)  mg/dL


 


Est GFR (CKD-EPI)AfAm    (>60 ml/min/1.73 sqM)  


 


Est GFR (CKD-EPI)NonAf    (>60 ml/min/1.73 sqM)  


 


Glucose    (74-99)  mg/dL


 


POC Glucose (mg/dL)    (75-99)  mg/dL


 


POC Glu Operater ID    


 


Calcium    (8.4-10.2)  mg/dL


 


Magnesium    (1.6-2.3)  mg/dL


 


Total Bilirubin    (0.2-1.3)  mg/dL


 


AST    (17-59)  U/L


 


ALT    (21-72)  U/L


 


Alkaline Phosphatase    ()  U/L


 


Troponin I   <0.012  (0.000-0.034)  ng/mL


 


Total Protein    (6.3-8.2)  g/dL


 


Albumin    (3.5-5.0)  g/dL














Disposition


Clinical Impression: 


 Hypertension, Headache





Disposition: HOME SELF-CARE


Condition: Stable


Instructions (If sedation given, give patient instructions):  Hypertension (ED)


Additional Instructions: 


Please follow-up with your primary care physician within 2-4 days.  Return to 

the emergency room for any new or worsening symptoms.


Is patient prescribed a controlled substance at d/c from ED?: No


Referrals: 


Yrn Carlin MD [Primary Care Provider] - 1-2 days


Time of Disposition: 17:33

## 2019-09-24 ENCOUNTER — HOSPITAL ENCOUNTER (OUTPATIENT)
Dept: HOSPITAL 47 - RADUSWWP | Age: 54
Discharge: HOME | End: 2019-09-24
Attending: INTERNAL MEDICINE
Payer: COMMERCIAL

## 2019-09-24 DIAGNOSIS — I10: Primary | ICD-10-CM

## 2019-09-24 PROCEDURE — 93975 VASCULAR STUDY: CPT

## 2019-09-24 NOTE — US
EXAMINATION TYPE: US renal artery duplex complet

 

DATE OF EXAM: 9/24/2019

 

COMPARISON: US & CT

 

CLINICAL HISTORY: I10 Essential primary hypertension. Pt states blood pressure drastically increased 
in last 6 months

 

MEASUREMENTS:

 

RENAL SIZE:

Rt Kidney: 11.7 x 5.5 x 6.1 cm

Lt Kidney: 12.2 x 6.1 x 6.1 cm

 

RESISTANCE INDEX

Right:  0.6

Left: 0.6

 

RA/AO RATIO (< 3.5 )

Right: 1.4

Left: 2.9

 

RA VELOCITY ( < 180 cm/s)

Right: 100.8

Left: 210.8

 

Nonobstructing renal calculus mid/lateral= 7mm/ Cyst upper pole= 1.1 x 1.0 x 0.9 cm

 

**Elevated velocities within proximal left renal artery, however criteria for renal arterial stenosis
 is not within either kidney.**

 

IMPRESSION:

1. No sonographic evidence of renal arterial stenosis.

2. Nonobstructing 7 mm calculus within the midpole the right kidney. Additionally incidentally noted 
benign-appearing 1.1 cm cyst is seen on the right.

## 2019-10-01 ENCOUNTER — HOSPITAL ENCOUNTER (OUTPATIENT)
Dept: HOSPITAL 47 - RADCTMAIN | Age: 54
Discharge: HOME | End: 2019-10-01
Attending: INTERNAL MEDICINE
Payer: COMMERCIAL

## 2019-10-01 DIAGNOSIS — D71: ICD-10-CM

## 2019-10-01 DIAGNOSIS — K76.89: ICD-10-CM

## 2019-10-01 DIAGNOSIS — K76.0: ICD-10-CM

## 2019-10-01 DIAGNOSIS — E27.5: Primary | ICD-10-CM

## 2019-10-01 PROCEDURE — 74170 CT ABD WO CNTRST FLWD CNTRST: CPT

## 2019-10-01 NOTE — CT
EXAMINATION TYPE: CT abdomen wo/w con

 

DATE OF EXAM: 10/1/2019

 

COMPARISON: 4/5/2015

 

HISTORY: Adrenomedullary hyperfunction, hypertension

 

CT DLP: 1881.3 mGycm

 

CONTRAST: 

CT scan of the abdomen and pelvis is performed with Oral Contrast and without and with IV Contrast, p
atient injected with 100 mL of Isovue 300.

 

FINDINGS: 

LUNG BASES-: No visible nodule.  No infiltrate. 

 

LIVER/GB:   Cholecystectomy clips noted. Simple cyst anterior segment right hepatic lobe measuring 3.
7 cm unchanged from prior study.  No solid space occupying hepatic lesion. Biliary tree is of normal 
caliber. 

 

PANCREAS:  No inflammation.  No distinct mass. 

A granuloma is seen.

 

SPLEEN:  No splenic enlargement.  No lesion seen. 

 

ADRENALS:  No nodule.  No thickening. 

 

KIDNEYS/BLADDER:  No hydronephrosis.  No nephrolithiasis.  No distinct renal mass.  Urinary bladder g
rossly unremarkable. 

 

BOWEL: Normal appendix.  Normal bowel caliber.  No inflammation.

 

GENITAL ORGANS:  No gross abnormality. 

 

LYMPH NODES:  No greater than 1cm abdominal or pelvic lymph nodes are appreciated.

 

AORTA: No significant abnormality. 

 

OSSEOUS STRUCTURES:  No significant abnormality is seen. 

 

OTHER:  No significant additional abnormality is seen. 

 

IMPRESSION: 

1. No evidence for adrenal cortical hyperplasia or adrenal nodule.

2. Hepatic cyst with fatty liver.

3. Remote granulomatous disease.

## 2019-10-05 ENCOUNTER — HOSPITAL ENCOUNTER (OUTPATIENT)
Dept: HOSPITAL 47 - LABWHC1 | Age: 54
Discharge: HOME | End: 2019-10-05
Attending: INTERNAL MEDICINE
Payer: COMMERCIAL

## 2019-10-05 DIAGNOSIS — I10: Primary | ICD-10-CM

## 2019-10-05 PROCEDURE — 82533 TOTAL CORTISOL: CPT

## 2019-10-05 PROCEDURE — 36415 COLL VENOUS BLD VENIPUNCTURE: CPT

## 2019-10-05 PROCEDURE — 82384 ASSAY THREE CATECHOLAMINES: CPT

## 2019-10-05 PROCEDURE — 82024 ASSAY OF ACTH: CPT

## 2019-10-05 PROCEDURE — 82088 ASSAY OF ALDOSTERONE: CPT

## 2019-10-05 PROCEDURE — 84244 ASSAY OF RENIN: CPT

## 2019-10-05 PROCEDURE — 83835 ASSAY OF METANEPHRINES: CPT

## 2019-10-11 LAB
METANEPH FREE SERPL-MCNC: <25 PG/ML
METANEPHS SERPL-MCNC: 125 PG/ML
NORMETANEPH FREE SERPL-MCNC: 125 PG/ML

## 2022-06-08 ENCOUNTER — HOSPITAL ENCOUNTER (OUTPATIENT)
Dept: HOSPITAL 47 - RADMRIMAIN | Age: 57
Discharge: HOME | End: 2022-06-08
Attending: NURSE PRACTITIONER
Payer: COMMERCIAL

## 2022-06-08 DIAGNOSIS — M41.84: Primary | ICD-10-CM

## 2022-06-08 PROCEDURE — 72146 MRI CHEST SPINE W/O DYE: CPT

## 2022-06-09 NOTE — MR
EXAMINATION TYPE: MR thoracic spine wo con

 

DATE OF EXAM: 6/8/2022

 

COMPARISON: None

 

HISTORY: Mid back pain for several months.

 

Multiplanar multi echo imaging of the thoracic spine with no contrast.

 

There is a slight thoracic dextroscoliosis. There is no evidence of thoracic compression fracture. Di
sc spaces are fairly normal for age. Thoracic spinal cord has normal signal pattern. No edema. No barbara
dence of thoracic spinal stenosis. No paraspinal mass. The posterior elements are intact.

 

IMPRESSION:

Negative exam. I do not see a cause for back pain. No fracture. Mild dextroscoliosis

## 2023-06-13 ENCOUNTER — HOSPITAL ENCOUNTER (OUTPATIENT)
Dept: HOSPITAL 47 - ORWHC2ENDO | Age: 58
Discharge: HOME | End: 2023-06-13
Attending: SURGERY
Payer: COMMERCIAL

## 2023-06-13 VITALS — DIASTOLIC BLOOD PRESSURE: 92 MMHG | HEART RATE: 73 BPM | SYSTOLIC BLOOD PRESSURE: 131 MMHG | RESPIRATION RATE: 18 BRPM

## 2023-06-13 VITALS — TEMPERATURE: 98 F

## 2023-06-13 DIAGNOSIS — Z90.49: ICD-10-CM

## 2023-06-13 DIAGNOSIS — K57.30: ICD-10-CM

## 2023-06-13 DIAGNOSIS — Z88.0: ICD-10-CM

## 2023-06-13 DIAGNOSIS — Z98.890: ICD-10-CM

## 2023-06-13 DIAGNOSIS — K21.9: ICD-10-CM

## 2023-06-13 DIAGNOSIS — Z79.899: ICD-10-CM

## 2023-06-13 DIAGNOSIS — D12.3: Primary | ICD-10-CM

## 2023-06-13 DIAGNOSIS — E78.5: ICD-10-CM

## 2023-06-13 DIAGNOSIS — Z79.82: ICD-10-CM

## 2023-06-13 DIAGNOSIS — Z88.5: ICD-10-CM

## 2023-06-13 DIAGNOSIS — I10: ICD-10-CM

## 2023-06-13 PROCEDURE — 45385 COLONOSCOPY W/LESION REMOVAL: CPT

## 2023-06-13 PROCEDURE — 88305 TISSUE EXAM BY PATHOLOGIST: CPT

## 2023-06-13 NOTE — P.PCN
Date of Procedure: 06/13/23


Procedure(s) Performed: 


PREOPERATIVE DIAGNOSIS: Diverticulitis


POSTOPERATIVE DIAGNOSIS: Diverticulosis, transverse colon polyp


PROCEDURE: Colonoscopy with snare polypectomy


ANESTHESIA: MAC


SURGEON: Cristobal Benton M.D.


SPECIMENS: Polyp


ENDOSCOPIC PROCEDURE:  The patient was placed on the endoscopy table in the left

decubitus position.  The Olympus colonoscope was inserted into the anus and 

passed under direct visualization to the base of the cecum.  The appendiceal 

orifice was visualized.  From that point the scope was slowly withdrawn 

inspecting all surfaces carefully.  There were no neoplastic inflammatory or 

polypoid lesions throughout the cecum and ascending colon.  In the transverse 

colon was noted to be a small polyp removed using the snare with cautery 

technique.  The remainder of the transverse descending sigmoid and rectum 

appeared normal.  There was moderate left-sided diverticulosis.  Digital rectal 

examination was normal.  The patient was taken to the recovery room in stable 

condition per anesthesia guidelines.


RECOMMENDATIONS: Await biopsy results.  Repeat colonoscopy in 5 years

## 2023-06-13 NOTE — P.GSHP
History of Present Illness


H&P Date: 06/13/23


Chief Complaint: Diverticulitis





58-year-old male here for colonoscopy.  Last colonoscopy 8 years ago.  It 

episode recently of left-sided abdominal pain with diarrhea.  Was told he may 

have diverticulitis.  No residual symptoms.  No family history of colon cancer.





Past Medical History


Past Medical History: GERD/Reflux, Hyperlipidemia, Hypertension


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


History of Any Multi-Drug Resistant Organisms: None Reported


Past Surgical History: Cholecystectomy, Orthopedic Surgery


Additional Past Surgical History / Comment(s): sleep apnea surgery, laproscopic 

repair for reflux, left knee surgery, rt ankle surgery


Past Anesthesia/Blood Transfusion Reactions: No Reported Reaction


Smoking Status: Never smoker





- Past Family History


  ** Mother


Family Medical History: No Reported History





Medications and Allergies


                                Home Medications











 Medication  Instructions  Recorded  Confirmed  Type


 


Omeprazole [PriLOSEC] 40 mg PO AC-BRKFST 04/05/15 06/09/23 History


 


Aspirin EC [Ecotrin] 81 mg PO DAILY 05/07/15 06/09/23 History


 


Multivitamins, Thera [Theragran] 1 tab PO DAILY 05/07/15 06/09/23 History


 


Omega-3 Fatty Acids/Fish Oil [Fish 1 cap PO DAILY 05/07/15 06/09/23 History





Oil 1,000 mg Softgel]    


 


dilTIAZem HCL [Cartia Xt] 240 mg PO DAILY 09/19/19 06/09/23 History


 


ALPRAZolam [Xanax] 0.5 mg PO HS PRN 06/09/23 06/09/23 History


 


ARIPiprazole [Abilify] 5 mg PO DAILY 06/09/23 06/09/23 History


 


Dextroamphetamine/Amphetamine 1 tab PO DAILY 06/09/23 06/09/23 History





[Adderall Xr 25 mg Capsule]    


 


Eplerenone 50 mg PO DAILY 06/09/23 06/09/23 History


 


Rosuvastatin [Crestor] 10 mg PO DAILY 06/09/23 06/09/23 History


 


Zolpidem [Ambien] 10 mg PO HS PRN 06/09/23 06/09/23 History


 


allopurinoL 300 mg PO DAILY 06/09/23 06/09/23 History


 


busPIRone HCl [Buspar] 10 mg PO DAILY 06/09/23 06/09/23 History








                                    Allergies











Allergy/AdvReac Type Severity Reaction Status Date / Time


 


amoxicillin Allergy  Anaphylaxis Verified 06/13/23 10:26


 


morphine Allergy  Anaphylaxis Verified 06/13/23 10:26














Surgical - Exam


                                   Vital Signs











Temp Pulse Resp BP Pulse Ox


 


 98 F   76   16   133/94   96 


 


 06/13/23 10:37  06/13/23 10:37  06/13/23 10:37  06/13/23 10:37  06/13/23 10:37

















Physical exam:


General: Well-developed, well-nourished


HEENT: Normocephalic, sclerae nonicteric


Abdomen: Nontender, nondistended


Extremities: No edema


Neuro: Alert and oriented





Assessment and Plan


(1) Diverticulitis


Narrative/Plan: 


Will proceed with colonoscopy at this time.


Current Visit: Yes   Status: Acute   Code(s): K57.92 - DVTRCLI OF INTEST, PART 

UNSP, W/O PERF OR ABSCESS W/O BLEED   SNOMED Code(s): 938879984

## 2023-09-01 ENCOUNTER — HOSPITAL ENCOUNTER (OUTPATIENT)
Dept: HOSPITAL 47 - RADXRMAIN | Age: 58
Discharge: HOME | End: 2023-09-01
Attending: INTERNAL MEDICINE
Payer: COMMERCIAL

## 2023-09-01 DIAGNOSIS — M51.36: Primary | ICD-10-CM

## 2023-09-01 DIAGNOSIS — M47.817: ICD-10-CM

## 2023-09-01 DIAGNOSIS — M48.061: ICD-10-CM

## 2023-09-01 PROCEDURE — 72100 X-RAY EXAM L-S SPINE 2/3 VWS: CPT

## 2023-09-01 NOTE — XR
EXAMINATION TYPE: XR lumbar spine 2 or 3V

 

DATE OF EXAM: 9/1/2023

 

CLINICAL HISTORY: Lower back pain

 

TECHNIQUE: Three views of the lumbar spine are submitted.

 

COMPARISON: CT abdomen and pelvis 4/5/2015

 

FINDINGS:  

There are 5 lumbar type vertebral bodies identified.  The lumbar spine shows satisfactory alignment w
ithout evidence of acute fracture or dislocation. Vertebral body heights are within normal limits.   
Multilevel disc with endplate sclerosis.  Marked facet arthropathy on the right at L5-S1. The overlyi
ng soft tissue appears unremarkable. Surgical clips in the mid and right upper quadrant. Vascular scl
erosis of the aorta.

 

IMPRESSION:  

1.  No acute fracture or dislocation is seen in the lumbar spine.

2.  Mild multilevel degenerative disc disease.

3.  Moderate to severe right L5-S1 facet arthropathy.

## 2023-09-19 ENCOUNTER — HOSPITAL ENCOUNTER (OUTPATIENT)
Dept: HOSPITAL 47 - RADMRIMAIN | Age: 58
Discharge: HOME | End: 2023-09-19
Attending: INTERNAL MEDICINE
Payer: COMMERCIAL

## 2023-09-19 DIAGNOSIS — M46.96: ICD-10-CM

## 2023-09-19 DIAGNOSIS — M47.817: Primary | ICD-10-CM

## 2023-09-19 PROCEDURE — 72158 MRI LUMBAR SPINE W/O & W/DYE: CPT

## 2023-09-21 NOTE — MR
EXAMINATION TYPE: MR lumbar spine wo/w con

 

DATE OF EXAM: 9/19/2023

 

COMPARISON: X-ray 9/1/2023

 

HISTORY: Low back pain and numbness into right leg and ankle

 

CONTRAST:  10 mL intravenous Gadavist.

 

TECHNIQUE: 

Multiplanar, multisequence images of the lumbar spine were acquired.

 

FINDINGS:  

L5-S1: Disc desiccation is present. Some mild disc narrowing may be present.  No AP spinal canal sten
osis present. Minimal central protrusion is present. No thecal sac compression is present. Neural for
amen are patent. Facet hypertrophy is present on the right.

 

Remaining disc levels appear normal normal hydration and preserved disc height. Vertebral body height
s are preserved. No focal disc herniation or significant disc bulge is evident. No spinal canal steno
sis or neural foraminal stenosis is present.

 

 

Cord terminates at the T12-L1 level. No abnormal enhancement is evident.

 

IMPRESSION:

1. Mild degenerative disc changes without stenosis L5-S1.